# Patient Record
Sex: FEMALE | URBAN - METROPOLITAN AREA
[De-identification: names, ages, dates, MRNs, and addresses within clinical notes are randomized per-mention and may not be internally consistent; named-entity substitution may affect disease eponyms.]

---

## 2018-10-10 ENCOUNTER — APPOINTMENT (RX ONLY)
Dept: URBAN - METROPOLITAN AREA CLINIC 35 | Facility: CLINIC | Age: 68
Setting detail: DERMATOLOGY
End: 2018-10-10

## 2018-10-10 DIAGNOSIS — Z41.9 ENCOUNTER FOR PROCEDURE FOR PURPOSES OTHER THAN REMEDYING HEALTH STATE, UNSPECIFIED: ICD-10-CM

## 2018-10-10 PROCEDURE — ? BOTOX

## 2018-10-10 PROCEDURE — ? FILLERS

## 2018-10-10 NOTE — PROCEDURE: BOTOX
Dilution (U/0.1 Cc): 4
Additional Area 5 Location: perioral
Additional Area 6 Units: 10
Additional Area 4 Location: Bunny lines
Levator Labii Superioris Units: 0
Detail Level: Detailed
Additional Area 3 Location: Glabella
Lot #: [id number]
Additional Area 3 Units: 15
Additional Area 6 Location: platysma
Additional Area 2 Location: Crows Feet
Additional Area 1 Location: Frontalis
Expiration Date (Month Year): 04/2021
Reconstitution Date (Optional): 10/9/18
Price (Use Numbers Only, No Special Characters Or $): 300
Consent: Written consent obtained. Risks include but not limited to lid/brow ptosis or drooping, bruising, swelling, diplopia/double vision, temporary effect, incomplete chemical denervation/relaxation of muscles.
Post-Care Instructions: Patient instructed to not lie down for 4 hours after injections and limit physical activity for 24 hours.

## 2018-10-10 NOTE — PROCEDURE: FILLERS
Detail Level: Detailed
Price (Use Numbers Only, No Special Characters Or $): 363
Use Map Statement For Sites (Optional): No
Map Statment: See Attach Map for Complete Details
Anesthesia Volume In Cc: 0
Additional Anesthesia Volume In Cc: 6
Filler: Juvederm Vollure XC
Topical Anesthesia?: BLT cream (benzocaine 20%, lidocaine 6%, tetracaine 4%)
Additional Area 1 Location: cheeks/mandible
Expiration Date (Month Year): 2019.12.18
Lot #: [id number]
Filler Comments: 0.5cc per side cheeks/mandible diluted 1:1 with lidocaine no epinephrine
Consent: Written consent obtained. Risks include but not limited to bruising, beading, irregular texture, ulceration, infection, allergic reaction, scar formation, incomplete augmentation, temporary nature, procedural pain.
Post-Care Instructions: Patient instructed to apply ice to reduce swelling.

## 2019-04-03 ENCOUNTER — APPOINTMENT (RX ONLY)
Dept: URBAN - METROPOLITAN AREA CLINIC 35 | Facility: CLINIC | Age: 69
Setting detail: DERMATOLOGY
End: 2019-04-03

## 2019-04-03 DIAGNOSIS — Z41.9 ENCOUNTER FOR PROCEDURE FOR PURPOSES OTHER THAN REMEDYING HEALTH STATE, UNSPECIFIED: ICD-10-CM

## 2019-04-03 PROCEDURE — ? BOTOX

## 2019-04-03 PROCEDURE — ? ADDITIONAL NOTES

## 2019-04-03 PROCEDURE — ? FILLERS

## 2019-04-03 NOTE — PROCEDURE: BOTOX
Post-Care Instructions: Patient instructed to not lie down for 4 hours after injections and limit physical activity for 24 hours.
Additional Area 4 Units: 0
Additional Area 2 Location: Crows Feet
Additional Area 3 Location: Frontalis
Detail Level: Detailed
Additional Area 1 Units: 15
Reconstitution Date (Optional): 4/3/2019
Expiration Date (Month Year): 01/2021
Price (Use Numbers Only, No Special Characters Or $): 300
Additional Area 5 Location: perioral
Additional Area 6 Location: platysma
Dilution (U/0.1 Cc): 5
Additional Area 4 Location: Bunny lines
Consent: Verbal and written informed consent were obtained to include the following risks: pain, swelling, bruising, eyelid or eyebrow droop, and lack of visible improvement of wrinkles in the areas treated.  The skin was cleansed with alcohol. Injections were administered with a 32g needle into the following areas:
Lot #: [id number]
Additional Area 6 Units: 10
Additional Area 1 Location: Glabella

## 2019-04-03 NOTE — PROCEDURE: FILLERS
Nasolabial Folds Filler Volume In Cc: 0
Additional Area 3 Location: Fine lines around mouth
Lot #: [id number]
Additional Area 1 Location: Oral Commisures
Include Cannula Information In Note?: No
Filler: Juvederm Vollure XC
Use Map Statement For Sites (Optional): Yes
Additional Area 2 Location: Border of lips
Additional Area 4 Location: Scars
Additional Area 1 Volume In Cc: 0.3
Additional Area 5 Location: Earlobes
Price (Use Numbers Only, No Special Characters Or $): 1300
Expiration Date (Month Year): 2019.11.26
Filler Comments: 0.2 cc of Juvederm was blended 1:2 with lidocaine no epinephrine and injected into fine lines around mouth
Map Statment: See Attach Map for Complete Details
Consent: Written consent obtained. Risks include but not limited to bruising, beading, irregular texture, ulceration, infection, allergic reaction, scar formation, incomplete augmentation, temporary nature, procedural pain.
Filler Comments: Vollure was blended 1:2 with lidocaine no epinephrine
Filler: Juvederm Ultra XC
Detail Level: Detailed
Jawline Filler Volume In Cc: 1
Lot #: [id number]
Additional Area 3 Volume In Cc: 0.2
Expiration Date (Month Year): 2020.05.20
Post-Care Instructions: Patient instructed to apply ice to reduce swelling.
Topical Anesthesia?: BLT cream (benzocaine 20%, lidocaine 6%, tetracaine 4%)
Nasolabial Folds Filler Volume In Cc: 0.5

## 2019-09-06 ENCOUNTER — APPOINTMENT (RX ONLY)
Dept: URBAN - METROPOLITAN AREA CLINIC 35 | Facility: CLINIC | Age: 69
Setting detail: DERMATOLOGY
End: 2019-09-06

## 2019-09-06 DIAGNOSIS — Z41.9 ENCOUNTER FOR PROCEDURE FOR PURPOSES OTHER THAN REMEDYING HEALTH STATE, UNSPECIFIED: ICD-10-CM

## 2019-09-06 PROCEDURE — ? FILLERS

## 2019-09-06 PROCEDURE — ? BOTOX

## 2019-09-06 PROCEDURE — ? ADDITIONAL NOTES

## 2019-09-06 NOTE — PROCEDURE: FILLERS
Price (Use Numbers Only, No Special Characters Or $): 363
Anesthesia Volume In Cc: 0
Detail Level: Detailed
Map Statment: See Attach Map for Complete Details
Additional Anesthesia Volume In Cc: 6
Use Map Statement For Sites (Optional): Yes
Filler: Juvederm Ultra XC
Additional Area 1 Location: oral coms
Nasolabial Folds Filler Volume In Cc: 0.2
Additional Area 2 Location: upper lip border
Additional Area 1 Volume In Cc: 0.3
Additional Area 3 Location: lower lip border
Additional Area 2 Volume In Cc: 0.1
Additional Area 4 Location: body of lower lip
Additional Area 5 Location: fine lines around mouth
Include Cannula Information In Note?: No
Expiration Date (Month Year): 08.07.2020
Lot #: [id number]
Post-Care Instructions: Patient instructed to apply ice to reduce swelling.
Consent: Written consent obtained. Risks include but not limited to bruising, beading, irregular texture, ulceration, infection, allergic reaction, scar formation, incomplete augmentation, temporary nature, procedural pain.

## 2019-09-06 NOTE — PROCEDURE: BOTOX
Levator Labii Superioris Units: 0
Additional Area 2 Location: Crows Feet
Post-Care Instructions: Patient instructed to not lie down for 4 hours after injections and limit physical activity for 24 hours.
Expiration Date (Month Year): 01/2021
Detail Level: Detailed
Consent: Verbal and written informed consent were obtained to include the following risks: pain, swelling, bruising, eyelid or eyebrow droop, and lack of visible improvement of wrinkles in the areas treated.  The skin was cleansed with alcohol. Injections were administered with a 32g needle into the following areas:
Dilution (U/0.1 Cc): 5
Additional Area 5 Location: perioral
Price (Use Numbers Only, No Special Characters Or $): 300
Additional Area 1 Location: Glabella
Lot #: [id number]
Additional Area 6 Location: platysma
Additional Area 6 Units: 10
Additional Area 1 Units: 15
Additional Area 3 Location: Frontalis
Additional Area 4 Location: Bunny lines

## 2020-01-24 ENCOUNTER — APPOINTMENT (RX ONLY)
Dept: URBAN - METROPOLITAN AREA CLINIC 35 | Facility: CLINIC | Age: 70
Setting detail: DERMATOLOGY
End: 2020-01-24

## 2020-01-24 PROCEDURE — ? FILLERS

## 2020-01-24 PROCEDURE — ? ADDITIONAL NOTES

## 2020-01-24 PROCEDURE — ? BOTOX

## 2020-01-25 DIAGNOSIS — Z41.9 ENCOUNTER FOR PROCEDURE FOR PURPOSES OTHER THAN REMEDYING HEALTH STATE, UNSPECIFIED: ICD-10-CM

## 2020-01-25 NOTE — PROCEDURE: ADDITIONAL NOTES
Detail Level: Simple
Additional Notes: Discussed Kybella to Jowls at next appointment
Detail Level: Detailed

## 2020-01-25 NOTE — PROCEDURE: FILLERS
Decollete Filler Volume In Cc: 0
Post-Care Instructions: Patient instructed to apply ice to reduce swelling.
Expiration Date (Month Year): 8/19/20
Expiration Date (Month Year): 2/12/21
Additional Area 4 Location: Scars
Include Cannula Information In Note?: No
Additional Area 5 Location: Earlobes
Detail Level: Detailed
Nasolabial Folds Filler Volume In Cc: 0.2
Additional Area 3 Location: Fine lines around mouth
Consent: Written consent obtained. Risks include but not limited to bruising, beading, irregular texture, ulceration, infection, allergic reaction, scar formation, incomplete augmentation, temporary nature, procedural pain.
Additional Area 2 Location: Border of lips
Price (Use Numbers Only, No Special Characters Or $): 1400
Additional Area 1 Location: Oral Commisures
Lot #: [id number]
Use Map Statement For Sites (Optional): Yes
Filler: Juvederm Voluma XC
Lot #: [id number]
Map Statment: See Attach Map for Complete Details
Topical Anesthesia?: 23% lidocaine, 7% tetracaine
Additional Area 1 Volume In Cc: 0.4
Filler Comments: 0.6 cc right cheek \\n0.4 cc left cheek
Filler: Juvederm Ultra XC

## 2020-01-25 NOTE — PROCEDURE: BOTOX
Reconstitution Date (Optional): 1/24/20
Nasal Root Units: 0
Additional Area 2 Location: Crows Feet
Additional Area 6 Units: 10
Post-Care Instructions: Patient instructed to not lie down for 4 hours after injections and limit physical activity for 24 hours.
Additional Area 1 Units: 15
Additional Area 3 Location: Frontalis
Price (Use Numbers Only, No Special Characters Or $): 300
Lot #: [id number]
Additional Area 1 Location: Glabella
Consent: Verbal and written informed consent were obtained to include the following risks: pain, swelling, bruising, eyelid or eyebrow droop, and lack of visible improvement of wrinkles in the areas treated.  The skin was cleansed with alcohol. Injections were administered with a 32g needle into the following areas:
Detail Level: Detailed
Dilution (U/0.1 Cc): 5
Additional Area 5 Location: perioral
Additional Area 6 Location: platysma
Expiration Date (Month Year): 05/22
Additional Area 4 Location: Bunny lines

## 2020-06-11 ENCOUNTER — APPOINTMENT (RX ONLY)
Dept: URBAN - METROPOLITAN AREA CLINIC 35 | Facility: CLINIC | Age: 70
Setting detail: DERMATOLOGY
End: 2020-06-11

## 2020-06-11 DIAGNOSIS — Z41.9 ENCOUNTER FOR PROCEDURE FOR PURPOSES OTHER THAN REMEDYING HEALTH STATE, UNSPECIFIED: ICD-10-CM

## 2020-06-11 PROCEDURE — ? KYBELLA INJECTION

## 2020-06-11 ASSESSMENT — LOCATION DETAILED DESCRIPTION DERM
LOCATION DETAILED: LEFT CENTRAL MANDIBULAR CHEEK
LOCATION DETAILED: RIGHT CENTRAL MANDIBULAR CHEEK

## 2020-06-11 ASSESSMENT — LOCATION SIMPLE DESCRIPTION DERM
LOCATION SIMPLE: LEFT CHEEK
LOCATION SIMPLE: RIGHT CHEEK

## 2020-06-11 ASSESSMENT — LOCATION ZONE DERM: LOCATION ZONE: FACE

## 2020-06-11 NOTE — PROCEDURE: KYBELLA INJECTION
Kybella Volume In Cc (Won't Render If 0): 0
Packages Used (Won't Render If 0 - Required If Using Inventory): 1
Lot #: [id number]
Procedure Text: The treatment areas were cleansed. Kybella was administered using the parameters mentioned above.
Consent: Written consent obtained. The following temporary side effects commonly occur during/after Kybella injections and are to be expected:\\n• Redness/swelling\\n• Bruising\\n• Discomfort\\n• Numbness\\n• Induration (hardening or firmness)\\n• Infection\\nMore rare but important side effects also include:\\n• Temporary injury to the marginal mandibular nerve (results in an uneven smile) was noted in 4% of study patients.\\n• Temporary discomfort and/or difficulty swallowing was noted in 2%.\\nThe Kybella procedure should not be performed on you if you:\\n• Have an active infection of the skin in the treatment area.\\n• Have current or prior history of difficulty swallowing or pain with swallowing.\\n• Are pregnant or breastfeeding.
Post-Care Instructions: Patient instructed to apply ice to reduce swelling.
Expiration Date (Month Year): 11/21
Number Of Grid Dots (Won't Render If 0): 10
Detail Level: Detailed
Treatment Area: [facility]
Anesthesia Volume In Cc: 0.5
Price (Use Numbers Only, No Special Characters Or $): 969

## 2020-09-02 ENCOUNTER — APPOINTMENT (RX ONLY)
Dept: URBAN - METROPOLITAN AREA CLINIC 35 | Facility: CLINIC | Age: 70
Setting detail: DERMATOLOGY
End: 2020-09-02

## 2020-09-02 DIAGNOSIS — Z41.9 ENCOUNTER FOR PROCEDURE FOR PURPOSES OTHER THAN REMEDYING HEALTH STATE, UNSPECIFIED: ICD-10-CM

## 2020-09-02 PROCEDURE — ? BOTOX

## 2020-09-02 PROCEDURE — ? ADDITIONAL NOTES

## 2020-09-02 PROCEDURE — ? FILLERS

## 2020-09-02 NOTE — PROCEDURE: FILLERS
Additional Area 3 Location: Fine lines around mouth
Temple Hollows Filler Volume In Cc: 0
Include Cannula Information In Note?: No
Additional Area 5 Location: Earlobes
Post-Care Instructions: Patient instructed to apply ice to reduce swelling.
Expiration Date (Month Year): 10/25/21
Additional Area 2 Location: Border of lips
Additional Area 4 Location: Scars
Filler Comments: 0.3 cc left nasalabial \\n0.1 cc right nasalabial
Additional Area 1 Location: Oral Commisures
Additional Area 2 Volume In Cc: 0.4
Topical Anesthesia?: BLT cream (benzocaine 20%, lidocaine 6%, tetracaine 4%)
Consent: Written consent obtained. Risks include but not limited to bruising, beading, irregular texture, ulceration, infection, allergic reaction, scar formation, incomplete augmentation, temporary nature, procedural pain.
Lot #: [id number]
Use Map Statement For Sites (Optional): Yes
Filler: Juvederm Voluma XC
Additional Area 1 Volume In Cc: 0.2
Filler: Juvederm Ultra XC
Expiration Date (Month Year): 6/16/21
Map Statment: See Attach Map for Complete Details
Price (Use Numbers Only, No Special Characters Or $): 1400
Lot #: [id number]
Detail Level: Detailed
Filler Comments: 0.65 cc right cheek \\n0.35 cc left cheek

## 2021-01-15 DIAGNOSIS — Z23 NEED FOR VACCINATION: ICD-10-CM

## 2021-07-30 ENCOUNTER — APPOINTMENT (RX ONLY)
Dept: URBAN - METROPOLITAN AREA CLINIC 35 | Facility: CLINIC | Age: 71
Setting detail: DERMATOLOGY
End: 2021-07-30

## 2021-07-30 DIAGNOSIS — Z41.9 ENCOUNTER FOR PROCEDURE FOR PURPOSES OTHER THAN REMEDYING HEALTH STATE, UNSPECIFIED: ICD-10-CM

## 2021-07-30 PROCEDURE — ? BOTOX

## 2021-07-30 PROCEDURE — ? COSMETIC CONSULTATION: PRODUCTS

## 2021-07-30 PROCEDURE — ? COSMETIC CONSULTATION: LASER RESURFACING

## 2021-07-30 PROCEDURE — ? FILLERS

## 2021-07-30 NOTE — PROCEDURE: FILLERS
Additional Area 5 Volume In Cc: 0
Include Cannula Information In Note?: No
Consent: Written consent obtained. Risks include but not limited to bruising, bleeding, blindness, stroke, delayed onset of nodules, irregular texture, ulceration, infection, allergic reaction, scar formation, incomplete augmentation, temporary nature, procedural pain.
Additional Area 2 Location: Border of lips
Additional Area 5 Location: Earlobes
Additional Area 4 Location: Scars
Additional Area 3 Location: Fine lines around mouth
Include Cannula Length?: 1.5 inch
Detail Level: Detailed
Additional Area 1 Location: Oral Commisures
Post-Care Instructions: Patient instructed to apply ice to reduce swelling.
Include Cannula Size?: 25G
Topical Anesthesia?: 23% lidocaine, 7% tetracaine
Filler: Juvederm Voluma XC
Lot #: [id number]
Use Map Statement For Sites (Optional): Yes
Price (Use Numbers Only, No Special Characters Or $): 1600
Expiration Date (Month Year): 4/21/22
Filler Comments: C1-0.2 cc per side\\nC2-0.1 cc per side \\nC6-0.2 cc per side \\nJW4&5- 0.5 cc per side
Map Statment: See Attach Map for Complete Details

## 2021-07-30 NOTE — PROCEDURE: BOTOX
Reconstitution Date (Optional): 7/30/21
Nasal Root Units: 0
Additional Area 2 Units: 24
Additional Area 2 Location: Crows Feet
Additional Area 6 Units: 7
Post-Care Instructions: Patient instructed to not lie down for 4 hours after injections and limit physical activity for 24 hours.
Additional Area 3 Location: Frontalis
Price (Use Numbers Only, No Special Characters Or $): 161
Lot #: [id number]
Additional Area 1 Location: Glabella
Consent: Verbal and written informed consent were obtained to include the following risks: pain, swelling, bruising, eyelid or eyebrow droop, and lack of visible improvement of wrinkles in the areas treated.  The skin was cleansed with alcohol. Injections were administered with a 32g needle into the following areas:
Detail Level: Detailed
Dilution (U/0.1 Cc): 1.1
Additional Area 5 Location: perioral
Additional Area 6 Location: platysma
Expiration Date (Month Year): 11/22
Additional Area 4 Location: Bunny lines

## 2022-04-11 ENCOUNTER — APPOINTMENT (RX ONLY)
Dept: URBAN - METROPOLITAN AREA CLINIC 35 | Facility: CLINIC | Age: 72
Setting detail: DERMATOLOGY
End: 2022-04-11

## 2022-04-11 DIAGNOSIS — Z41.9 ENCOUNTER FOR PROCEDURE FOR PURPOSES OTHER THAN REMEDYING HEALTH STATE, UNSPECIFIED: ICD-10-CM

## 2022-04-11 PROCEDURE — ? BOTOX

## 2022-04-11 PROCEDURE — ? FILLERS

## 2022-04-11 NOTE — PROCEDURE: FILLERS
Nasolabial Folds Filler Volume In Cc: 0
Additional Area 1 Location: Oral Commisures
Additional Area 2 Location: Border of lips
Post-Care Instructions: Patient instructed to apply ice to reduce swelling.
Additional Area 3 Location: Fine lines around mouth
Use Map Statement For Sites (Optional): Yes
Additional Area 4 Location: Scars
Include Cannula Information In Note?: No
Additional Area 5 Location: Earlobes
Filler: Juvederm Voluma XC
Map Statment: See Attach Map for Complete Details
Consent: Written consent obtained. Risks include but not limited to bruising, bleeding, blindness, stroke, delayed onset of nodules, irregular texture, ulceration, infection, allergic reaction, scar formation, incomplete augmentation, temporary nature, procedural pain.
Detail Level: Detailed

## 2022-04-11 NOTE — PROCEDURE: BOTOX
Reconstitution Date (Optional): 7/30/21
Nasal Root Units: 0
Additional Area 2 Units: 24
Additional Area 2 Location: Crows Feet
Additional Area 6 Units: 7
Post-Care Instructions: Patient instructed to not lie down for 4 hours after injections and limit physical activity for 24 hours.
Additional Area 3 Location: Frontalis
Price (Use Numbers Only, No Special Characters Or $): 186
Lot #: [id number]
Additional Area 1 Location: Glabella
Consent: Verbal and written informed consent were obtained to include the following risks: pain, swelling, bruising, eyelid or eyebrow droop, and lack of visible improvement of wrinkles in the areas treated.  The skin was cleansed with alcohol. Injections were administered with a 32g needle into the following areas:
Detail Level: Detailed
Dilution (U/0.1 Cc): 1.1
Additional Area 5 Location: perioral
Additional Area 6 Location: platysma
Expiration Date (Month Year): 11/22
Additional Area 4 Location: Bunny lines

## 2022-05-18 ENCOUNTER — APPOINTMENT (RX ONLY)
Dept: URBAN - METROPOLITAN AREA CLINIC 35 | Facility: CLINIC | Age: 72
Setting detail: DERMATOLOGY
End: 2022-05-18

## 2022-05-18 DIAGNOSIS — Z41.9 ENCOUNTER FOR PROCEDURE FOR PURPOSES OTHER THAN REMEDYING HEALTH STATE, UNSPECIFIED: ICD-10-CM

## 2022-05-18 PROCEDURE — ? BOTOX

## 2022-05-18 PROCEDURE — ? FILLERS

## 2022-05-18 NOTE — PROCEDURE: FILLERS
Topical Anesthesia?: 23% lidocaine, 7% tetracaine
Tear Troughs Filler Volume In Cc: 0
Additional Area 3 Location: Fine lines around mouth
Include Cannula Information In Note?: No
Expiration Date (Month Year): 5/2/23
Consent: Written consent obtained. Risks include but not limited to bruising, bleeding, blindness, stroke, delayed onset of nodules, irregular texture, ulceration, infection, allergic reaction, scar formation, incomplete augmentation, temporary nature, procedural pain.
Additional Area 4 Location: Scars
Additional Area 2 Location: Border of lips
Filler: Juvederm Voluma XC
Lot #: [id number]
Map Statment: See Attach Map for Complete Details
Additional Area 1 Location: Oral Commisures
Filler: Juvederm Ultra XC
Price (Use Numbers Only, No Special Characters Or $): 1400
Additional Area 5 Location: Earlobes
Lot #: [id number]
Post-Care Instructions: Patient instructed to apply ice to reduce swelling.
Filler Comments: 0.5 cc right cheek \\n0.4 cc left cheek \\n0.1 cc right chin/jaw
Detail Level: Detailed
Expiration Date (Month Year): 3/17/23
Use Map Statement For Sites (Optional): Yes

## 2022-05-18 NOTE — PROCEDURE: BOTOX
Reconstitution Date (Optional): 5/18/22
Nasal Root Units: 0
Additional Area 2 Units: 24
Additional Area 2 Location: Crows Feet
Post-Care Instructions: Patient instructed to not lie down for 4 hours after injections and limit physical activity for 24 hours.
Additional Area 3 Location: Frontalis
Price (Use Numbers Only, No Special Characters Or $): 795
Lot #: [id number]
Additional Area 1 Location: Glabella
Consent: Verbal and written informed consent were obtained to include the following risks: pain, swelling, bruising, eyelid or eyebrow droop, and lack of visible improvement of wrinkles in the areas treated.  The skin was cleansed with alcohol. Injections were administered with a 32g needle into the following areas:
Detail Level: Detailed
Dilution (U/0.1 Cc): 1.1
Additional Area 5 Location: perioral
Additional Area 6 Location: platysma
Expiration Date (Month Year): 5/24
Additional Area 4 Location: Bunny lines

## 2022-10-19 ENCOUNTER — APPOINTMENT (RX ONLY)
Dept: URBAN - METROPOLITAN AREA CLINIC 35 | Facility: CLINIC | Age: 72
Setting detail: DERMATOLOGY
End: 2022-10-19

## 2022-10-19 DIAGNOSIS — Z41.9 ENCOUNTER FOR PROCEDURE FOR PURPOSES OTHER THAN REMEDYING HEALTH STATE, UNSPECIFIED: ICD-10-CM

## 2022-10-19 PROCEDURE — ? BOTOX

## 2022-10-19 NOTE — PROCEDURE: BOTOX
Reconstitution Date (Optional): 5/18/22
Nasal Root Units: 0
Additional Area 2 Units: 24
Additional Area 2 Location: Crows Feet
Post-Care Instructions: Patient instructed to not lie down for 4 hours after injections and limit physical activity for 24 hours.
Additional Area 3 Location: Frontalis
Price (Use Numbers Only, No Special Characters Or $): 092
Lot #: [id number]
Additional Area 1 Location: Glabella
Consent: Verbal and written informed consent were obtained to include the following risks: pain, swelling, bruising, eyelid or eyebrow droop, and lack of visible improvement of wrinkles in the areas treated.  The skin was cleansed with alcohol. Injections were administered with a 32g needle into the following areas:
Detail Level: Detailed
Dilution (U/0.1 Cc): 1.1
Additional Area 5 Location: perioral
Additional Area 6 Location: platysma
Expiration Date (Month Year): 5/24
Additional Area 4 Location: Bunny lines

## 2022-11-10 ENCOUNTER — APPOINTMENT (RX ONLY)
Dept: URBAN - METROPOLITAN AREA CLINIC 35 | Facility: CLINIC | Age: 72
Setting detail: DERMATOLOGY
End: 2022-11-10

## 2022-11-10 DIAGNOSIS — Z41.9 ENCOUNTER FOR PROCEDURE FOR PURPOSES OTHER THAN REMEDYING HEALTH STATE, UNSPECIFIED: ICD-10-CM

## 2022-11-10 PROCEDURE — ? FILLERS

## 2022-11-10 NOTE — PROCEDURE: FILLERS
Topical Anesthesia?: 23% lidocaine, 7% tetracaine
Tear Troughs Filler Volume In Cc: 0
Additional Area 3 Location: Fine lines around mouth
Include Cannula Information In Note?: Yes
Expiration Date (Month Year): 8/11/2023
Include Cannula Information In Note?: No
Consent: Written consent obtained. Risks include but not limited to bruising, bleeding, blindness, stroke, delayed onset of nodules, irregular texture, ulceration, infection, allergic reaction, scar formation, incomplete augmentation, temporary nature, procedural pain.
Additional Area 4 Location: Scars
Additional Area 2 Location: Border of lips
Filler: Juvederm Voluma XC
Include Cannula Size?: 25G
Include Cannula Length?: 1.5 inch
Lot #: [id number]
Map Statment: See Attach Map for Complete Details
Additional Area 1 Location: Oral Commisures
Filler: Juvederm Ultra XC
Price (Use Numbers Only, No Special Characters Or $): 1400
Additional Area 5 Location: Earlobes
Lot #: [id number]
Post-Care Instructions: Patient instructed to apply ice to reduce swelling.
Filler Comments: 0.5cc per side w/cannula\\nSome in mandible and fanned into CK4
Filler Comments: 0.2cc OC\\n0.2cc lip border lateral\\n0.4cc marionettes\\n0.2cc NL
Detail Level: Detailed
Expiration Date (Month Year): 10/30/2023
Aspiration Statement: Aspiration was performed prior to injecting site with filler.

## 2023-08-01 ENCOUNTER — APPOINTMENT (RX ONLY)
Dept: URBAN - METROPOLITAN AREA CLINIC 35 | Facility: CLINIC | Age: 73
Setting detail: DERMATOLOGY
End: 2023-08-01

## 2023-08-01 DIAGNOSIS — Z41.9 ENCOUNTER FOR PROCEDURE FOR PURPOSES OTHER THAN REMEDYING HEALTH STATE, UNSPECIFIED: ICD-10-CM

## 2023-08-01 PROCEDURE — ? FILLERS

## 2023-08-01 PROCEDURE — ? BOTOX

## 2023-08-01 NOTE — PROCEDURE: BOTOX
Nasal Root Units: 0
Additional Area 2 Units: 24
Additional Area 2 Location: Crows Feet
Post-Care Instructions: Patient instructed to not lie down for 4 hours after injections and limit physical activity for 24 hours.
Additional Area 3 Location: Frontalis
Price (Use Numbers Only, No Special Characters Or $): 266
Lot #: [id number]
Additional Area 1 Location: Glabella
Consent: Verbal and written informed consent were obtained to include the following risks: pain, swelling, bruising, eyelid or eyebrow droop, and lack of visible improvement of wrinkles in the areas treated.  The skin was cleansed with alcohol. Injections were administered with a 32g needle into the following areas:
Detail Level: Detailed
Dilution (U/0.1 Cc): 1.1
Additional Area 5 Location: perioral
Additional Area 6 Location: platysma
Expiration Date (Month Year): 2026-05
Additional Area 4 Location: Bunny lines
Incrementing Botox Units: By 0.1 Units

## 2023-08-01 NOTE — PROCEDURE: FILLERS
Topical Anesthesia?: 23% lidocaine, 7% tetracaine
Tear Troughs Filler Volume In Cc: 0
Additional Area 3 Location: Fine lines around mouth
Include Cannula Information In Note?: Yes
Expiration Date (Month Year): 2023-08-11
Include Cannula Information In Note?: No
Consent: Written consent obtained. Risks include but not limited to bruising, bleeding, blindness, stroke, delayed onset of nodules, irregular texture, ulceration, infection, allergic reaction, scar formation, incomplete augmentation, temporary nature, procedural pain.
Additional Area 4 Location: Scars
Additional Area 2 Location: Border of lips
Filler: Juvederm Voluma XC
Include Cannula Size?: 25G
Include Cannula Length?: 1.5 inch
Lot #: [id number]
Map Statment: See Attach Map for Complete Details
Additional Area 1 Location: Oral Commisures
Filler: Juvederm Ultra XC
Price (Use Numbers Only, No Special Characters Or $): 1526
Additional Area 5 Location: Earlobes
Lot #: [id number]
Post-Care Instructions: Patient instructed to apply ice to reduce swelling.
Filler Comments: Voluma x4 (4.0 ml) \\n1 cc per side temples with cannula \\n1 cc per side mandible with cannula
Filler Comments: Lindavederm Ultra XC (1.0 ml)\\n0.2 cc lobes \\n0.4 cc lips \\n0.2 cc O.C. \\n0.2 cc geovany
Detail Level: Detailed
Expiration Date (Month Year): 2024-02-25
Aspiration Statement: Aspiration was performed prior to injecting site with filler.

## 2023-10-31 ENCOUNTER — APPOINTMENT (RX ONLY)
Dept: URBAN - METROPOLITAN AREA CLINIC 35 | Facility: CLINIC | Age: 73
Setting detail: DERMATOLOGY
End: 2023-10-31

## 2023-10-31 DIAGNOSIS — Z41.9 ENCOUNTER FOR PROCEDURE FOR PURPOSES OTHER THAN REMEDYING HEALTH STATE, UNSPECIFIED: ICD-10-CM

## 2023-10-31 PROCEDURE — ? FILLERS

## 2023-10-31 NOTE — PROCEDURE: FILLERS
Tear Troughs Filler Volume In Cc: 0
Additional Area 3 Location: Fine lines around mouth
Include Cannula Information In Note?: Yes
Expiration Date (Month Year): 2023-08-11
Include Cannula Information In Note?: No
Consent: Written consent obtained. Risks include but not limited to bruising, bleeding, blindness, stroke, delayed onset of nodules, irregular texture, ulceration, infection, allergic reaction, scar formation, incomplete augmentation, temporary nature, procedural pain.
Additional Area 4 Location: Scars
Additional Area 2 Location: Border of lips
Filler: Juvederm Ultra XC
Include Cannula Size?: 25G
Include Cannula Length?: 1.5 inch
Lot #: [id number]
Map Statment: See Attach Map for Complete Details
Additional Area 1 Location: Oral Commisures
Price (Use Numbers Only, No Special Characters Or $): 236
Additional Area 5 Location: Earlobes
Lot #: [id number]
Post-Care Instructions: Patient instructed to apply ice to reduce swelling.
Filler Comments: Juvederm  Ultra XC 1.0 ml see face map\\n0.25 cc right mandible with cannula \\nNL, O.C, lips with needle
Filler Comments: Lindavederm Ultra XC (1.0 ml)\\n0.2 cc lobes \\n0.4 cc lips \\n0.2 cc O.C. \\n0.2 cc geovany
Detail Level: Detailed
Expiration Date (Month Year): 2024-04-19
Aspiration Statement: Aspiration was performed prior to injecting site with filler.

## 2024-07-30 ENCOUNTER — APPOINTMENT (RX ONLY)
Dept: URBAN - METROPOLITAN AREA CLINIC 35 | Facility: CLINIC | Age: 74
Setting detail: DERMATOLOGY
End: 2024-07-30

## 2024-07-30 DIAGNOSIS — Z41.9 ENCOUNTER FOR PROCEDURE FOR PURPOSES OTHER THAN REMEDYING HEALTH STATE, UNSPECIFIED: ICD-10-CM

## 2024-07-30 PROCEDURE — ? ADDITIONAL NOTES

## 2024-07-30 PROCEDURE — ? BOTOX

## 2024-07-30 PROCEDURE — ? FILLERS

## 2024-07-30 NOTE — PROCEDURE: ADDITIONAL NOTES
Additional Notes: Next visit lateral cheeks / mandible
Render Risk Assessment In Note?: no
Detail Level: Detailed

## 2024-07-30 NOTE — PROCEDURE: FILLERS
Consent: Written consent obtained. Risks include but not limited to bruising, bleeding, blindness, stroke, delayed onset of nodules, irregular texture, ulceration, infection, allergic reaction, scar formation, incomplete augmentation, temporary nature, procedural pain.

## 2024-07-30 NOTE — PROCEDURE: BOTOX
Nasal Root Units: 0
Additional Area 2 Units: 24
Additional Area 2 Location: Crows Feet
Post-Care Instructions: Patient instructed to not lie down for 4 hours after injections and limit physical activity for 24 hours.
Additional Area 3 Location: Frontalis
Price (Use Numbers Only, No Special Characters Or $): 229
Lot #: [id number]
Additional Area 1 Location: Glabella
Consent: Verbal and written informed consent were obtained to include the following risks: pain, swelling, bruising, eyelid or eyebrow droop, and lack of visible improvement of wrinkles in the areas treated.  The skin was cleansed with alcohol. Injections were administered with a 32g needle into the following areas:
Detail Level: Detailed
Dilution (U/0.1 Cc): 1.1
Additional Area 5 Location: perioral
Additional Area 6 Location: platysma
Expiration Date (Month Year): 2026-09
Additional Area 4 Location: Bunny lines
Incrementing Botox Units: By 0.1 Units

## 2025-02-26 ENCOUNTER — APPOINTMENT (OUTPATIENT)
Dept: URBAN - METROPOLITAN AREA CLINIC 35 | Facility: CLINIC | Age: 75
Setting detail: DERMATOLOGY
End: 2025-02-26

## 2025-02-26 DIAGNOSIS — Z41.9 ENCOUNTER FOR PROCEDURE FOR PURPOSES OTHER THAN REMEDYING HEALTH STATE, UNSPECIFIED: ICD-10-CM

## 2025-02-26 PROCEDURE — ? FILLERS

## 2025-02-26 PROCEDURE — ? BOTOX

## 2025-02-26 PROCEDURE — ? ADDITIONAL NOTES

## 2025-02-26 NOTE — PROCEDURE: FILLERS
Mid Face Filler Volume In Cc: 0
Additional Area 1 Location: Oral Commisures
Topical Anesthesia?: 23% lidocaine, 7% tetracaine
Include Cannula Size?: 25G
Additional Area 4 Location: Scars
Include Cannula Information In Note?: No
Filler: Juvederm Voluma XC
Additional Area 5 Location: Earlobes
Include Cannula Length?: 1.5 inch
Additional Area 3 Location: Fine lines around mouth
Map Statment: See Attach Map for Complete Details
Additional Area 2 Location: Border of lips
Expiration Date (Month Year): 2026-02-17
Filler: Juvederm Ultra XC
Price (Use Numbers Only, No Special Characters Or $): 5238
Lot #: [id number]
Filler Comments: Voluma x2 (2.0 ml), see face map \\n1.0 cc left cheek, mandible with cannula \\n0.2 cc right CK1 with cannula \\n0.8 cc right mandibles, right preauricular with cannula
Expiration Date (Month Year): 2026-05-08 // 2026-05-09
Post-Care Instructions: Patient instructed to apply ice to reduce swelling.
Consent: Written consent obtained. Risks include but not limited to bruising, bleeding, blindness, stroke, delayed onset of nodules, irregular texture, ulceration, infection, allergic reaction, scar formation, incomplete augmentation, temporary nature, procedural pain.
Detail Level: Detailed
Include Cannula Information In Note?: Yes
Lot #: [id number]
Filler Comments: Juvederm Ultra XC 1.0 ml, see face map \\n0.5 cc lips \\n0.2 cc O.C \\n0.1 cc chin \\n0.2 cc NL \\Jame with needle
Aspiration Statement: Aspiration was performed prior to injecting site with filler.

## 2025-02-26 NOTE — PROCEDURE: BOTOX
Nasal Root Units: 0
Additional Area 6 Location: platysma
Additional Area 3 Location: Frontalis
Dilution (U/0.1 Cc): 1.1
Additional Area 2 Location: Crows Feet
Additional Area 4 Location: Bunny lines
Additional Area 1 Location: Glabella
Expiration Date (Month Year): 2027-02
Price (Use Numbers Only, No Special Characters Or $): 115
Additional Area 5 Location: perioral
Lot #: [id number]
Consent: Verbal and written informed consent were obtained to include the following risks: pain, swelling, bruising, eyelid or eyebrow droop, and lack of visible improvement of wrinkles in the areas treated.  The skin was cleansed with alcohol. Injections were administered with a 32g needle into the following areas:
Detail Level: Detailed
Post-Care Instructions: Patient instructed to not lie down for 4 hours after injections and limit physical activity for 24 hours.
Show Right And Left Pupillary Line Units: No
Show Orbicularis Oculi Units: Yes
Additional Area 2 Units: 24
Incrementing Botox Units: By 0.1 Units

## 2025-07-24 ENCOUNTER — APPOINTMENT (OUTPATIENT)
Dept: URBAN - METROPOLITAN AREA CLINIC 38 | Facility: CLINIC | Age: 75
Setting detail: DERMATOLOGY
End: 2025-07-24

## 2025-07-24 DIAGNOSIS — L82.0 INFLAMED SEBORRHEIC KERATOSIS: ICD-10-CM

## 2025-07-24 DIAGNOSIS — L71.8 OTHER ROSACEA: ICD-10-CM

## 2025-07-24 PROCEDURE — ? PRESCRIPTION

## 2025-07-24 PROCEDURE — ? COUNSELING

## 2025-07-24 PROCEDURE — ? LIQUID NITROGEN

## 2025-07-24 RX ORDER — METRONIDAZOLE 7.5 MG/G
CREAM TOPICAL BID
Qty: 45 | Refills: 6 | Status: ERX | COMMUNITY
Start: 2025-07-24

## 2025-07-24 RX ADMIN — METRONIDAZOLE: 7.5 CREAM TOPICAL at 00:00

## 2025-07-24 ASSESSMENT — LOCATION ZONE DERM
LOCATION ZONE: FACE
LOCATION ZONE: TRUNK

## 2025-07-24 ASSESSMENT — LOCATION SIMPLE DESCRIPTION DERM
LOCATION SIMPLE: LEFT CHEEK
LOCATION SIMPLE: RIGHT CHEEK
LOCATION SIMPLE: CHEST

## 2025-07-24 ASSESSMENT — LOCATION DETAILED DESCRIPTION DERM
LOCATION DETAILED: LEFT INFERIOR CENTRAL MALAR CHEEK
LOCATION DETAILED: RIGHT INFERIOR CENTRAL MALAR CHEEK
LOCATION DETAILED: RIGHT MEDIAL SUPERIOR CHEST

## 2025-07-24 NOTE — PROCEDURE: LIQUID NITROGEN
Medical Necessity Information: It is in your best interest to select a reason for this procedure from the list below. All of these items fulfill various CMS LCD requirements except the new and changing color options.
Consent: The patient's consent was obtained including but not limited to risks of crusting, scabbing, blistering, scarring, darker or lighter pigmentary change, recurrence, incomplete removal and infection.
Show Topical Anesthesia Variable?: Yes
Number Of Freeze-Thaw Cycles: 2 freeze-thaw cycles
Render Note In Bullet Format When Appropriate: No
Detail Level: Detailed
Medical Necessity Clause: This procedure was medically necessary because the lesions that were treated were:
Spray Paint Text: The liquid nitrogen was applied to the skin utilizing a spray paint frosting technique.
Post-Care Instructions: I reviewed with the patient in detail post-care instructions. Patient is to wear sunprotection, and avoid picking at any of the treated lesions. Pt may apply Vaseline to crusted or scabbing areas.